# Patient Record
Sex: MALE | HISPANIC OR LATINO | Employment: OTHER | URBAN - METROPOLITAN AREA
[De-identification: names, ages, dates, MRNs, and addresses within clinical notes are randomized per-mention and may not be internally consistent; named-entity substitution may affect disease eponyms.]

---

## 2018-07-04 ENCOUNTER — HOSPITAL ENCOUNTER (EMERGENCY)
Facility: MEDICAL CENTER | Age: 68
End: 2018-07-04
Attending: EMERGENCY MEDICINE
Payer: MEDICARE

## 2018-07-04 ENCOUNTER — APPOINTMENT (OUTPATIENT)
Dept: RADIOLOGY | Facility: MEDICAL CENTER | Age: 68
End: 2018-07-04
Attending: EMERGENCY MEDICINE
Payer: MEDICARE

## 2018-07-04 VITALS
BODY MASS INDEX: 27.95 KG/M2 | TEMPERATURE: 98.3 F | HEIGHT: 65 IN | OXYGEN SATURATION: 98 % | RESPIRATION RATE: 16 BRPM | DIASTOLIC BLOOD PRESSURE: 95 MMHG | WEIGHT: 167.77 LBS | SYSTOLIC BLOOD PRESSURE: 151 MMHG | HEART RATE: 89 BPM

## 2018-07-04 DIAGNOSIS — I15.9 SECONDARY HYPERTENSION: ICD-10-CM

## 2018-07-04 DIAGNOSIS — R74.8 ABNORMAL SERUM LEVEL OF LIPASE: ICD-10-CM

## 2018-07-04 DIAGNOSIS — D64.9 ANEMIA, UNSPECIFIED TYPE: ICD-10-CM

## 2018-07-04 DIAGNOSIS — E87.5 HYPERKALEMIA: ICD-10-CM

## 2018-07-04 DIAGNOSIS — N17.9 ACUTE RENAL FAILURE, UNSPECIFIED ACUTE RENAL FAILURE TYPE (HCC): ICD-10-CM

## 2018-07-04 LAB
ALBUMIN SERPL BCP-MCNC: 4.3 G/DL (ref 3.2–4.9)
ALBUMIN/GLOB SERPL: 1.2 G/DL
ALP SERPL-CCNC: 95 U/L (ref 30–99)
ALT SERPL-CCNC: 9 U/L (ref 2–50)
ANION GAP SERPL CALC-SCNC: 11 MMOL/L (ref 0–11.9)
APPEARANCE UR: CLEAR
AST SERPL-CCNC: 13 U/L (ref 12–45)
BASOPHILS # BLD AUTO: 0.5 % (ref 0–1.8)
BASOPHILS # BLD: 0.03 K/UL (ref 0–0.12)
BILIRUB SERPL-MCNC: 0.3 MG/DL (ref 0.1–1.5)
BILIRUB UR QL STRIP.AUTO: NEGATIVE
BNP SERPL-MCNC: 59 PG/ML (ref 0–100)
BUN SERPL-MCNC: 48 MG/DL (ref 8–22)
CALCIUM SERPL-MCNC: 10.1 MG/DL (ref 8.5–10.5)
CHLORIDE SERPL-SCNC: 110 MMOL/L (ref 96–112)
CK SERPL-CCNC: 37 U/L (ref 0–154)
CO2 SERPL-SCNC: 17 MMOL/L (ref 20–33)
COLOR UR: YELLOW
CREAT SERPL-MCNC: 2.46 MG/DL (ref 0.5–1.4)
EKG IMPRESSION: NORMAL
EOSINOPHIL # BLD AUTO: 0.22 K/UL (ref 0–0.51)
EOSINOPHIL NFR BLD: 3.6 % (ref 0–6.9)
ERYTHROCYTE [DISTWIDTH] IN BLOOD BY AUTOMATED COUNT: 42.7 FL (ref 35.9–50)
GLOBULIN SER CALC-MCNC: 3.5 G/DL (ref 1.9–3.5)
GLUCOSE SERPL-MCNC: 105 MG/DL (ref 65–99)
GLUCOSE UR STRIP.AUTO-MCNC: NEGATIVE MG/DL
HCT VFR BLD AUTO: 43.5 % (ref 42–52)
HGB BLD-MCNC: 14 G/DL (ref 14–18)
IMM GRANULOCYTES # BLD AUTO: 0.02 K/UL (ref 0–0.11)
IMM GRANULOCYTES NFR BLD AUTO: 0.3 % (ref 0–0.9)
KETONES UR STRIP.AUTO-MCNC: NEGATIVE MG/DL
LACTATE BLD-SCNC: 1.8 MMOL/L (ref 0.5–2)
LEUKOCYTE ESTERASE UR QL STRIP.AUTO: NEGATIVE
LIPASE SERPL-CCNC: 91 U/L (ref 11–82)
LYMPHOCYTES # BLD AUTO: 1.63 K/UL (ref 1–4.8)
LYMPHOCYTES NFR BLD: 26.8 % (ref 22–41)
MCH RBC QN AUTO: 29.7 PG (ref 27–33)
MCHC RBC AUTO-ENTMCNC: 32.2 G/DL (ref 33.7–35.3)
MCV RBC AUTO: 92.2 FL (ref 81.4–97.8)
MICRO URNS: NORMAL
MONOCYTES # BLD AUTO: 0.4 K/UL (ref 0–0.85)
MONOCYTES NFR BLD AUTO: 6.6 % (ref 0–13.4)
NEUTROPHILS # BLD AUTO: 3.79 K/UL (ref 1.82–7.42)
NEUTROPHILS NFR BLD: 62.2 % (ref 44–72)
NITRITE UR QL STRIP.AUTO: NEGATIVE
NRBC # BLD AUTO: 0 K/UL
NRBC BLD-RTO: 0 /100 WBC
PH UR STRIP.AUTO: 5 [PH]
PLATELET # BLD AUTO: 369 K/UL (ref 164–446)
PMV BLD AUTO: 9.6 FL (ref 9–12.9)
POTASSIUM SERPL-SCNC: 5.5 MMOL/L (ref 3.6–5.5)
PROT SERPL-MCNC: 7.8 G/DL (ref 6–8.2)
PROT UR QL STRIP: NEGATIVE MG/DL
RBC # BLD AUTO: 4.72 M/UL (ref 4.7–6.1)
RBC UR QL AUTO: NEGATIVE
SODIUM SERPL-SCNC: 138 MMOL/L (ref 135–145)
SP GR UR STRIP.AUTO: 1.01
TROPONIN I SERPL-MCNC: <0.01 NG/ML (ref 0–0.04)
UROBILINOGEN UR STRIP.AUTO-MCNC: 0.2 MG/DL
WBC # BLD AUTO: 6.1 K/UL (ref 4.8–10.8)

## 2018-07-04 PROCEDURE — 83880 ASSAY OF NATRIURETIC PEPTIDE: CPT

## 2018-07-04 PROCEDURE — 71045 X-RAY EXAM CHEST 1 VIEW: CPT

## 2018-07-04 PROCEDURE — 36415 COLL VENOUS BLD VENIPUNCTURE: CPT

## 2018-07-04 PROCEDURE — 82550 ASSAY OF CK (CPK): CPT

## 2018-07-04 PROCEDURE — 83690 ASSAY OF LIPASE: CPT

## 2018-07-04 PROCEDURE — 80053 COMPREHEN METABOLIC PANEL: CPT

## 2018-07-04 PROCEDURE — 81003 URINALYSIS AUTO W/O SCOPE: CPT

## 2018-07-04 PROCEDURE — 99284 EMERGENCY DEPT VISIT MOD MDM: CPT

## 2018-07-04 PROCEDURE — 84484 ASSAY OF TROPONIN QUANT: CPT

## 2018-07-04 PROCEDURE — 93005 ELECTROCARDIOGRAM TRACING: CPT | Performed by: EMERGENCY MEDICINE

## 2018-07-04 PROCEDURE — 74176 CT ABD & PELVIS W/O CONTRAST: CPT

## 2018-07-04 PROCEDURE — 85025 COMPLETE CBC W/AUTO DIFF WBC: CPT

## 2018-07-04 PROCEDURE — 83605 ASSAY OF LACTIC ACID: CPT

## 2018-07-04 RX ORDER — PANTOPRAZOLE SODIUM 40 MG/1
40 TABLET, DELAYED RELEASE ORAL DAILY
COMMUNITY

## 2018-07-04 RX ORDER — SUCRALFATE 1 G/1
1 TABLET ORAL DAILY
COMMUNITY

## 2018-07-04 ASSESSMENT — PAIN SCALES - GENERAL
PAINLEVEL_OUTOF10: 0

## 2018-07-04 NOTE — ED PROVIDER NOTES
ED Provider Note    Scribed for Casey Balbuena M.D. by Adrian Mcdaniels. 7/4/2018  8:51 AM    Primary Care Provider: Clemente Rivera at [facility]  Means of arrival: Walk-in  History limited by: None    CHIEF COMPLAINT  Chief Complaint   Patient presents with   • Abnormal Labs     Sent in by PCP for elevated potassium after lab work yesterday.     • N/V       HPI  Dylon Ibrahim is a 68 y.o. male who presents to the ED complaining of vomiting this morning and was sent in by his PCP for abnormal labs. The patient reports of experiencing an episode of right upper abdomina pain a week ago that initially resolved, but developed similar symptoms again this morning with associated nausea and vomiting. The patient had blood work done yesterday at [facility] due to generalized malaise for the last week and was sent to the ED this morning for elevated potassium. He reports of sore throat, weight loss, and myalgia, but denies any headache, vision changes, ear pain, chest pain, shortness of breath, palpitations, diarrhea, dysuria, or rashes. He states the abdominal pain has now resolved. He reports of numbness to right fingers for the last week, but has history of arthritis in bilateral fingers. He is currently on Metformin for diabetes, Metoprolol for hypertension, Pantoprazole for GI, lovastatin for hyperlipidemia, and other medications.     Patient's daughter was able to help interpret. She states the patient has overall been normal and at baseline.     REVIEW OF SYSTEMS  CONSTITUTIONAL: Weight loss.   EYES:  Denies vision changes.   ENT: Sore throat. Denies ear pain.   CARDIOVASCULAR:  Denies chest pain, palpitations  RESPIRATORY:  Denies shortness of breath, difficulty breathing.  GI: Abdominal pain, nausea, vomiting. Denies diarrhea.   : Denies dysuria.   MUSCULOSKELETAL: Myalgia.   SKIN:  No rash  NEUROLOGIC:  Denies headache. Chronic numbness to right hand due to hx arthritis  C.     PAST MEDICAL HISTORY  Past Medical  "History:   Diagnosis Date   • DIABETES MELLITUS 9/13/2013    -oral meds    • GOUT 9/13/2013   • HTN (hypertension) 9/13/2013   • Hyperlipidemia 9/13/2013   • Nephrolithiasis 9/13/2013    -right, 2013    • NSTEMI (non-ST elevated myocardial infarction), Lcx 2010 9/13/2013    -PCI LCx ([facility])        FAMILY HISTORY  History reviewed. No pertinent family history.    SOCIAL HISTORY   reports that he has quit smoking. He has never used smokeless tobacco. He reports that he does not drink alcohol or use drugs.    SURGICAL HISTORY  History reviewed. No pertinent surgical history.    CURRENT MEDICATIONS  Home Medications     Reviewed by Sharonda Templeton R.N. (Registered Nurse) on 07/04/18 at 0848  Med List Status: Partial   Medication Last Dose Status   aspirin (ASA) 81 MG CHEW  Active   linagliptin (TRADJENTA) 5 MG Tab tablet  Active   lisinopril (PRINIVIL) 20 MG TABS  Active   lovastatin (MEVACOR) 20 MG TABS 7/3/2018 Active   metformin (GLUCOPHAGE) 1000 MG tablet 7/3/2018 Active   methimazole (TAPAZOLE) 5 MG TABS 2/5/2016 Active   metoprolol SR (TOPROL XL) 50 MG TB24 7/3/2018 Active   pantoprazole (PROTONIX) 40 MG Tablet Delayed Response 7/3/2018 Active   sucralfate (CARAFATE) 1 GM Tab 7/3/2018 Active                ALLERGIES  Allergies   Allergen Reactions   • Naprosyn [Naproxen]        PHYSICAL EXAM  VITAL SIGNS: /95   Pulse 93   Temp 36.8 °C (98.3 °F)   Resp 16   Ht 1.651 m (5' 5\")   Wt 76.1 kg (167 lb 12.3 oz)   SpO2 95%   BMI 27.92 kg/m²      Constitutional: Patient is awake and alert. No acute respiratory distress. Well developed, Well nourished, Non-toxic appearance.  HENT: Normocephalic, Atraumatic, Bilateral external ears normal, Oropharynx pink moist with no exudates, Nose patent.  Eyes: Sclera and conjunctiva clear, No discharge.   Neck:  Supple no nuchal rigidity, no thyromegaly or mass. Non-tender  Lymphatic: No supraclavicular lymph nodes.   Cardiovascular: Heart is regular rate and rhythm no " murmur, rub or thrill.   Thorax & Lungs: Chest is symmetrical, with good breath sounds. No wheezing or crackles. No respiratory distress, No chest tenderness.   Abdomen: Soft, No tenderness no hepatosplenomegaly there is no guarding or rebound, No masses, No pulsatile masses.  Skin: Warm, Dry, no petechia, purpura, or rash.   Back: Non tender with palpation, No CVA tenderness.   Extremities: No edema. Non tender.   Musculoskeletal: Good range of motion to wrists, elbows, shoulders, hips, knees, and ankles. Pulses 2+ radially and femorally. Unable to flex left fingers right > left, reports of pain when trying to form a fist, bilateral  weak.   Neurologic: Alert & oriented to person, time, and place. Sensory is intact to light touch to arms, and legs.  DTRs are symmetrical in biceps brachioradialis, patella and Achilles.   Psychiatric: Normal affect    EKG  0923- 13 Lead EKG interpreted by me shows normal sinus rhythm at rate 85.  . Axis QRS 21, No ST elevations. No peaked T waves. Questionable J point elevation in V5. No old EKG for comparison.   LABS  Results for orders placed or performed during the hospital encounter of 07/04/18   CBC WITH DIFFERENTIAL   Result Value Ref Range    WBC 6.1 4.8 - 10.8 K/uL    RBC 4.72 4.70 - 6.10 M/uL    Hemoglobin 14.0 14.0 - 18.0 g/dL    Hematocrit 43.5 42.0 - 52.0 %    MCV 92.2 81.4 - 97.8 fL    MCH 29.7 27.0 - 33.0 pg    MCHC 32.2 (L) 33.7 - 35.3 g/dL    RDW 42.7 35.9 - 50.0 fL    Platelet Count 369 164 - 446 K/uL    MPV 9.6 9.0 - 12.9 fL    Neutrophils-Polys 62.20 44.00 - 72.00 %    Lymphocytes 26.80 22.00 - 41.00 %    Monocytes 6.60 0.00 - 13.40 %    Eosinophils 3.60 0.00 - 6.90 %    Basophils 0.50 0.00 - 1.80 %    Immature Granulocytes 0.30 0.00 - 0.90 %    Nucleated RBC 0.00 /100 WBC    Neutrophils (Absolute) 3.79 1.82 - 7.42 K/uL    Lymphs (Absolute) 1.63 1.00 - 4.80 K/uL    Monos (Absolute) 0.40 0.00 - 0.85 K/uL    Eos (Absolute) 0.22 0.00 - 0.51 K/uL    Baso  (Absolute) 0.03 0.00 - 0.12 K/uL    Immature Granulocytes (abs) 0.02 0.00 - 0.11 K/uL    NRBC (Absolute) 0.00 K/uL   COMP METABOLIC PANEL   Result Value Ref Range    Sodium 138 135 - 145 mmol/L    Potassium 5.5 3.6 - 5.5 mmol/L    Chloride 110 96 - 112 mmol/L    Co2 17 (L) 20 - 33 mmol/L    Anion Gap 11.0 0.0 - 11.9    Glucose 105 (H) 65 - 99 mg/dL    Bun 48 (H) 8 - 22 mg/dL    Creatinine 2.46 (H) 0.50 - 1.40 mg/dL    Calcium 10.1 8.5 - 10.5 mg/dL    AST(SGOT) 13 12 - 45 U/L    ALT(SGPT) 9 2 - 50 U/L    Alkaline Phosphatase 95 30 - 99 U/L    Total Bilirubin 0.3 0.1 - 1.5 mg/dL    Albumin 4.3 3.2 - 4.9 g/dL    Total Protein 7.8 6.0 - 8.2 g/dL    Globulin 3.5 1.9 - 3.5 g/dL    A-G Ratio 1.2 g/dL   LIPASE   Result Value Ref Range    Lipase 91 (H) 11 - 82 U/L   TROPONIN   Result Value Ref Range    Troponin I <0.01 0.00 - 0.04 ng/mL   BTYPE NATRIURETIC PEPTIDE   Result Value Ref Range    B Natriuretic Peptide 59 0 - 100 pg/mL   LACTIC ACID   Result Value Ref Range    Lactic Acid 1.8 0.5 - 2.0 mmol/L   URINALYSIS CULTURE, IF INDICATED   Result Value Ref Range    Color Yellow     Character Clear     Specific Gravity 1.014 <1.035    Ph 5.0 5.0 - 8.0    Glucose Negative Negative mg/dL    Ketones Negative Negative mg/dL    Protein Negative Negative mg/dL    Bilirubin Negative Negative    Urobilinogen, Urine 0.2 Negative    Nitrite Negative Negative    Leukocyte Esterase Negative Negative    Occult Blood Negative Negative    Micro Urine Req see below    CREATINE KINASE   Result Value Ref Range    CPK Total 37 0 - 154 U/L   ESTIMATED GFR   Result Value Ref Range    GFR If  32 (A) >60 mL/min/1.73 m 2    GFR If Non African American 26 (A) >60 mL/min/1.73 m 2   All labs reviewed by me.    RADIOLOGY/PROCEDURES  CT-RENAL COLIC EVALUATION(A/P W/O)   Final Result      1 mm calculus at the left UVJ does not cause hydroureteronephrosis      Moderate coronary artery disease      DX-CHEST-PORTABLE (1 VIEW)   Final Result       1.  There is no acute cardiopulmonary process.                          The radiologist's interpretations of all radiological studies have been reviewed by me.     COURSE & MEDICAL DECISION MAKING  Pertinent Labs & Imaging studies reviewed. (See chart for details)    Differential diagnoses include but are not limited to abnormal labs, possible hyperkalemia vs lab error, possible medication side effects, infection, PNA, UTI, renal failure.     8:51 AM - Patient seen and examined at bedside. Ordered DX-chest, creatine kinase, CBC, CMP, lipase, troponin, BNP, lactic acid, urinalysis culture, and EKG.     10:05 AM I received a call from the nursing staff from [facility], who informed me the patient's potassium was noted to be 6.3 yesterday, but they do not have the official lab results. This notation was dictated by the provider some time through the night.     10:12 AM Reviewed the patient's lab results, which reveals mildly elevated lipase and labs consistent with kidney failure, which however is chronic. Ordered CT-abdomen pelvis without for further evaluation. Patient was reevaluated at bedside. He denies any abdominal pain at this time. Plan of care was updated with the patient and daughter at bedside.    12:33 PM Still waiting for CT results.     1315.  Recheck.  The patient is resting comfortably.  No abdominal pain whatsoever.    Medical decision making  Discussed the case with Dr. Hay nephrology.  We reviewed the labs and medications.  Will have him stop his Metformin and lisinopril.  There would like to see him in the office within 1 week.  I have also written an order for outpatient labs including a urine urine micral albumin ratio.  CBC and B N P.  I discussed this with the patient's daughter who is her .  I personally handed the 2 medications she should stop to her and told he should not take these any longer continue his other medications.  He appears to understand that he needs very  close follow-up as an outpatient that his lipase was elevated mildly.  That he has high blood pressure and borderline elevated potassium.  However his potassium now is 5.5.  I was told by the [facility] physician that they got a report that his potassium is 6.3 yesterday but it is not that high today.    Again I believe he is stable for discharge home for close follow-up as an outpatient.          FINAL IMPRESSION  1.  Renal failure  2.  Elevation lipase      PLAN  1.  Follow-up Dr. Hay nephrology within 1 week  2.  Repeat laboratories in the orders have been given to the patient to have these done prior to the visit to the nephrologist  3.  Follow-up with the [facility] clinic tomorrow  4.  Stop his metformin and lisinopril  5. Return to the emergency department for increased pains, fevers, vomiting or change in condition.     IAdrian (Scribe), am scribing for, and in the presence of, Casey Balbuena M.D..    Electronically signed by: Adrian Mcdaniels ([place]), 7/4/2018    ICasey M.D. personally performed the services described in this documentation, as scribed by Adrian Mcdaniels in my presence, and it is both accurate and complete.    The note accurately reflects work and decisions made by me.  Casey Balbuena  7/4/2018  2:43 PM

## 2018-07-04 NOTE — ED TRIAGE NOTES
".  Chief Complaint   Patient presents with   • Abnormal Labs     Sent in by PCP for elevated potassium after lab work yesterday.     • N/V     Vomiting in the mornings*10 days.     Patient ambulatory to triage.  Standard annual lab work showed abnormal potassium.  Possible abdominal pain currently, patient poor historian with language barrier.      Explained wait time and triage process to pt. Pt placed back out in lobby, told to notify ED tech or triage RN of any changes, verbalized understanding.    /95   Pulse 93   Temp 36.8 °C (98.3 °F)   Resp 16   Ht 1.651 m (5' 5\")   Wt 76.1 kg (167 lb 12.3 oz)   SpO2 95%   BMI 27.92 kg/m²     "

## 2018-07-04 NOTE — ED NOTES
Pt amb to restroom, urine to lab, Dr. Balbuena into talk with pt, pt updated on POC, no other needs at this time, family at BS

## 2018-07-04 NOTE — ED NOTES
Discharge instructions given.  All questions answered.  Reviewed with pt to stop taking Metformin and Lisinopril.  Pt to follow-up with PCP and Nephrologist.  Pt verbalized understanding.  All belongings with pt.  Pt ambulated to lobby.

## 2018-07-12 ENCOUNTER — OFFICE VISIT (OUTPATIENT)
Dept: NEPHROLOGY | Facility: MEDICAL CENTER | Age: 68
End: 2018-07-12
Payer: MEDICARE

## 2018-07-12 VITALS
SYSTOLIC BLOOD PRESSURE: 138 MMHG | WEIGHT: 177 LBS | RESPIRATION RATE: 16 BRPM | BODY MASS INDEX: 29.49 KG/M2 | OXYGEN SATURATION: 98 % | HEIGHT: 65 IN | DIASTOLIC BLOOD PRESSURE: 76 MMHG | TEMPERATURE: 98.4 F | HEART RATE: 98 BPM

## 2018-07-12 DIAGNOSIS — E55.9 VITAMIN D DEFICIENCY: ICD-10-CM

## 2018-07-12 DIAGNOSIS — E11.29 MICROALBUMINURIA DUE TO TYPE 2 DIABETES MELLITUS (HCC): ICD-10-CM

## 2018-07-12 DIAGNOSIS — I10 ESSENTIAL HYPERTENSION: ICD-10-CM

## 2018-07-12 DIAGNOSIS — N18.4 CKD (CHRONIC KIDNEY DISEASE), STAGE IV (HCC): ICD-10-CM

## 2018-07-12 DIAGNOSIS — R80.9 MICROALBUMINURIA DUE TO TYPE 2 DIABETES MELLITUS (HCC): ICD-10-CM

## 2018-07-12 DIAGNOSIS — M10.9 GOUT, UNSPECIFIED CAUSE, UNSPECIFIED CHRONICITY, UNSPECIFIED SITE: ICD-10-CM

## 2018-07-12 DIAGNOSIS — N20.0 NEPHROLITHIASIS: ICD-10-CM

## 2018-07-12 PROCEDURE — 99214 OFFICE O/P EST MOD 30 MIN: CPT | Performed by: INTERNAL MEDICINE

## 2018-07-12 ASSESSMENT — ENCOUNTER SYMPTOMS
COUGH: 0
ABDOMINAL PAIN: 0
NAUSEA: 0
WHEEZING: 0
EYES NEGATIVE: 1
SHORTNESS OF BREATH: 0
HEADACHES: 0
FOCAL WEAKNESS: 0
FLANK PAIN: 0
PALPITATIONS: 0
HEARTBURN: 0
FEVER: 0
VOMITING: 0
NECK PAIN: 0
CHILLS: 0
BACK PAIN: 0
WEIGHT LOSS: 0
ORTHOPNEA: 0
SENSORY CHANGE: 0
MYALGIAS: 0
DIZZINESS: 0

## 2018-07-12 NOTE — PROGRESS NOTES
"Subjective:      Dylon Ibrahim is a 65 y.o. male who [place] and Chronic Kidney Disease            Chronic Kidney Disease   Pertinent negatives include no abdominal pain, chest pain, chills, coughing, fever, headaches, myalgias, nausea, neck pain or vomiting.     Dylon is coming today for f/u of CKD IV  Last time seen in the clinic in 2016 YAZAN, severe right hydronephrosis  Passed kidney stone.  Creat level at that time 2.88  Recently seen in ER as has been called from lab with elevated K level  Repeated K in ER was 5.5 , creat level 2.54  Stopped Metformin and lisinopril  Doing well , no complaints  No dysuria, hematuria/flank pain  HTN: BP well controlled    Review of Systems   Constitutional: Negative for chills, fever, malaise/fatigue and weight loss.   HENT: Negative.    Eyes: Negative.    Respiratory: Negative for cough, shortness of breath and wheezing.    Cardiovascular: Negative for chest pain, palpitations, orthopnea and leg swelling.   Gastrointestinal: Negative for abdominal pain, heartburn, nausea and vomiting.   Genitourinary: Negative for dysuria, flank pain, frequency, hematuria and urgency.   Musculoskeletal: Negative for back pain, myalgias and neck pain.   Skin: Negative.    Neurological: Negative for dizziness, sensory change, focal weakness and headaches.   All other systems reviewed and are negative.         Objective:     /76   Pulse 98   Temp 36.9 °C (98.4 °F)   Resp 16   Ht 1.651 m (5' 5\")   Wt 80.3 kg (177 lb)   SpO2 98%   BMI 29.45 kg/m²      Physical Exam   Constitutional: He is oriented to person, place, and time. He appears well-developed and well-nourished.   HENT:   Head: Normocephalic and atraumatic.   Nose: Nose normal.   Mouth/Throat: Oropharynx is clear and moist.   Eyes: Conjunctivae and EOM are normal. Pupils are equal, round, and reactive to light.   Neck: Normal range of motion. Neck supple. No thyromegaly present. "   Cardiovascular: Normal rate, regular rhythm and normal heart sounds.  Exam reveals no gallop and no friction rub.    Pulmonary/Chest: Effort normal and breath sounds normal. No respiratory distress. He has no wheezes. He has no rales.   Abdominal: Soft. Bowel sounds are normal. He exhibits no distension. There is tenderness.   Musculoskeletal: He exhibits no edema.   Neurological: He is alert and oriented to person, place, and time. No cranial nerve deficit. Coordination normal.   Skin: Skin is warm and dry. No rash noted. No erythema.          Laboratory results reviewed: d/w Pt  Lab Results   Component Value Date/Time    CREATININE 2.46 (H) 07/04/2018 08:54 AM    CREATININE 1.4 06/05/2006 01:05 PM    POTASSIUM 5.5 07/04/2018 08:54 AM          Assessment/Plan:     1.CKD IV -to monitor closely                   CKD education  2.HTN: BP well controlled  3.Electrolytes: borderline elevated k -stopped lisinopril  4.Anemia: Hb WNL  5.DM II -to monitor microalbuminuria  6.Vit D def  7.Hx/of gout -monitor uric acid  Recs: monitor BP, keep well hydrated , low na diet             Avoid NSAID's             f/u in 2 months with BMP, urine microalb/creat ratio, vit D, PTH, uric acid

## 2018-08-07 ENCOUNTER — HOSPITAL ENCOUNTER (OUTPATIENT)
Dept: RADIOLOGY | Facility: MEDICAL CENTER | Age: 68
End: 2018-08-07
Attending: INTERNAL MEDICINE
Payer: MEDICARE

## 2018-08-07 DIAGNOSIS — N18.4 CKD (CHRONIC KIDNEY DISEASE), STAGE IV (HCC): ICD-10-CM

## 2018-08-07 DIAGNOSIS — N20.0 NEPHROLITHIASIS: ICD-10-CM

## 2018-08-07 PROCEDURE — 76775 US EXAM ABDO BACK WALL LIM: CPT

## 2021-03-03 DIAGNOSIS — Z23 NEED FOR VACCINATION: ICD-10-CM

## 2025-03-28 ENCOUNTER — HOSPITAL ENCOUNTER (OUTPATIENT)
Dept: LAB | Facility: MEDICAL CENTER | Age: 75
End: 2025-03-28
Attending: NURSE PRACTITIONER
Payer: MEDICARE

## 2025-03-28 LAB
ALBUMIN SERPL BCP-MCNC: 3 G/DL (ref 3.2–4.9)
BUN SERPL-MCNC: 19 MG/DL (ref 8–22)
CALCIUM ALBUM COR SERPL-MCNC: 10 MG/DL (ref 8.5–10.5)
CALCIUM SERPL-MCNC: 9.2 MG/DL (ref 8.5–10.5)
CHLORIDE SERPL-SCNC: 104 MMOL/L (ref 96–112)
CO2 SERPL-SCNC: 23 MMOL/L (ref 20–33)
CREAT SERPL-MCNC: 1.39 MG/DL (ref 0.5–1.4)
EST. AVERAGE GLUCOSE BLD GHB EST-MCNC: 177 MG/DL
GFR SERPLBLD CREATININE-BSD FMLA CKD-EPI: 54 ML/MIN/1.73 M 2
GLUCOSE SERPL-MCNC: 83 MG/DL (ref 65–99)
HBA1C MFR BLD: 7.8 % (ref 4–5.6)
PHOSPHATE SERPL-MCNC: 3.4 MG/DL (ref 2.5–4.5)
POTASSIUM SERPL-SCNC: 4.7 MMOL/L (ref 3.6–5.5)
SODIUM SERPL-SCNC: 139 MMOL/L (ref 135–145)
T4 FREE SERPL-MCNC: 0.49 NG/DL (ref 0.93–1.7)
TSH SERPL DL<=0.005 MIU/L-ACNC: 48.6 UIU/ML (ref 0.38–5.33)

## 2025-03-28 PROCEDURE — 84439 ASSAY OF FREE THYROXINE: CPT

## 2025-03-28 PROCEDURE — 84443 ASSAY THYROID STIM HORMONE: CPT

## 2025-03-28 PROCEDURE — 83036 HEMOGLOBIN GLYCOSYLATED A1C: CPT | Mod: GA

## 2025-03-28 PROCEDURE — 80069 RENAL FUNCTION PANEL: CPT

## 2025-03-28 PROCEDURE — 36415 COLL VENOUS BLD VENIPUNCTURE: CPT | Mod: GA

## 2025-07-22 NOTE — DISCHARGE INSTRUCTIONS
Hipertensión arterial  (Arterial Hypertension)  La hipertensión arterial (presión sanguínea elevada) es un trastorno que consiste en la elevación de la presión dentro de pamela vasos sanguíneos. La hipertensión, con el correr del tiempo, favorece el riesgo de padecer ictus, infartos o insuficiencias cardíacas. También es zeinab de las causas principales de insuficiencia renal (del riñón).   CAUSAS  · En adultos  Más del 90% de todos los casos de hipertensión no tienen causas conocidas. [place] se denomina hipertensión esencial o primaria. En el 10% restante de personas con hipertensión, el aumento en la presión arterial es causado por otras enfermedades. [place] se denomina hipertensión secundaria. LAS CAUSAS MÁS IMPORTANTES DE HIPERTENSIÓN SECUDARIA SON:  · Abuso en el consumo de bebidas alcohólicas.  · Apnea del sueño obstructiva.  · Hiperaldosteronismo (Síndrome de [place]).  · Uso de esteroides.  · Insuficiencia renal crónica.  · Hiperparatiroidismo.  · Medicamentos.  · Estenosis de la arteria renal.  · Feocromocitoma.  · Enfermedad de [place].  · Coartación de la aorta.  · Crisis de esclerodermia renal.  · Regaliz (en cantidades excesivas).  · Drogas (cocaína, metanfetamina).  El profesional que lo asiste le explicará los puntos mencionados arriba que pueden estar referidos a usted.  · En niños  La hipertensión secundaria es la más común y siempre debe ser considerada mckenzie causa.  · Embarazo  Pocas mujeres en edad de procrear tienen yuni presión arterial. Sin embargo, hasta el 10% de ellas desarrollan hipertensión inducida por el embarazo. En general, esto no perjudicará a la becca (gerardo). Puede ser un síntoma de 3 complicaciones del embarazo: preeclampsia, síndrome HELLP (por pamela siglas en inglés) y eclampsia. Es necesario realizar un seguimiento y el control con medicación.  SÍNTOMAS  · Esta enfermedad normalmente no produce ningún síntoma perceptible. Generalmente se descubre en un examen de rutina.  · La hipertensión  Lab and EKG performed for pending surgery in [place]   "maligna (hipertensión acelerada) es un problema (complicación) tardía de la presión arterial elevada. Puede tener los siguientes síntomas:  · Dolor de mckenzie.  · Visión borrosa.  · Daño en órganos sabas (significa que los riñones, el corazón, los pulmones y otros órganos se están dañando).  · Las situaciones de estrés pueden aumentar la presión arterial. Si zeinab persona con presión arterial normal tiene un aumento en anaya presión en el momento en que es controlada por el profesional, se denomina \"hipertensión de guardapolvo vyas\". Anaya gravedad no se conoce. Puede estar relacionado con un desarrollo futuro de hipertensión, o con complicaciones de la hipertensión.  · La hipertensión generalmente se confunde con tensión intelectual, estrés y ansiedad.  DIAGNÓSTICO  El diagnóstico se realiza mediante 3 mediciones de presión sanguínea individuales. Se miladis con zeinab diferencia de al menos zeinab semana entre cada zeinab. Si hay un órgano dañado por causa de la hipertensión, el diagnóstico puede realizarse sin repetir las mediciones.  La hipertensión normalmente se identifica (diagnostica) si se obtienen los siguientes niveles:  · Más de 140/90 mm Hg medidos en ambos brazos, en 3 mediciones distintas, a lo finesse de dos semanas.  · Más de 130/80 mm Hg debe considerarse un factor de riesgo y puede requerir tratamiento en pacientes con diabetes.  La presión arterial de más de 120/80 mm Hg se denomina \"pre-hipertensión\", aún en pacientes no diabéticos.  Para obtener zeinab medición precisa de la presión sanguínea, siga las siguientes indicaciones. Sea consciente de los factores que pueden alterar las mediciones de presión sanguínea.  · [place] la presión al menos zeinab hora luego de consumir cafeína.  · [place] la presión 30 minutos luego de fumar y sin estrés. Mandi es otro motivo para dejar de fumar: aumenta la presión arterial.  · Use un manguito de tamaño adecuado. Consulte al profesional que lo asiste si no está seguro acerca del tamaño que " "le corresponde.  · La mayor parte de los tensiómetros hogareños son automáticos. Medirán la presión sistólica y diastólica. La presión sistólica es la que se mide al comienzo de los sonidos. La presión diastólica es la presión en la que los sonidos desaparecen. Si usted es anciano, mida distintas presiones en distintas posiciones. Intente sentado, acostado o parado.  · Siéntese y descanse por al menos 5 minutos antes de ignacio las mediciones.  · No debería ignacio medicamentos mckenzie descongestionantes (estimulantes adrenérgicos) antes de ignacio la medición. Estos se encuentran en muchos medicamentos para el resfrío.  · [place] nota de las mediciones de anaya presión sanguínea e infórmeselas al profesional que lo asiste.  Si usted tiene hipertensión:  · El profesional que lo asiste podrá realizar exámenes para asegurarse de que no tenga hipertensión secundaria (ketan \"causas\", arriba).  · El profesional que lo asiste también podrá buscar síntomas de Síndrome Metabólico. También se denomina Síndrome X o Síndrome de Insulinorresistencia. Podrá tener juancho síndrome si, además de hipertensión, tiene diabetes del tipo 2, obesidad abdominal, y lípidos sanguíneos anormales.  · El profesional que lo asiste tomará anaya historia médica y familiar y realizará un examen físico.  · Las pruebas de diagnóstico pueden incluir exámenes de sanjay (de glucosa, colesterol, potasio, y función del riñón), un análisis de orina, o un electrocardiograma. Puede ser necesario realizar otras pruebas según anaya mable particular.  PREVENCIÓN  Existen algunos importantes consejos relacionados con anaya estilo de jaelyn que puede adoptar para reducir pamela probabilidades de desarrollar hipertensión.  · Mantenga un peso normal.  · Limite la cantidad de sal (sodio) en anaya dieta.  · Ejercítese regularmente.  · Limite el consumo de alcohol.  · Lleve zeinab dieta con suficiente potasio. Saline consejos específicos con el profesional que lo asiste.  · [place] un dieta DASH ( dieta que " ayuda a frenar la hipertensión por pamela siglas en inglés). Esta dieta es willis en frutas, vegetales, y productos lácteos descremados, y emily ciertos tipos de grasas.  PRONÓSTICO:  La hipertensión esencial no puede curarse. Los cambios en el estilo de jaelyn y el tratamiento médico pueden disminuir la presión sanguínea y reducir las complicaciones. El pronóstico de la hipertensión secundaria depende de la causa subyacente. Muchas personas controlan anaya hipertensión con medicamentos o cambios en anaya estilo de jaelyn y de esta forma pueden vivir zeinab jaelyn normal y saludable.   RIESGOS Y COMPLICACIONES.  Mientras la presión arterial yuni en sí misma no es zeinab enfermedad, a menudo requiere tratamiento debido a los efectos a finesse y corto plazo que tiene sobre muchos órganos. La hipertensión aumenta el riesgo de padecer:  · ACV o ictus (accidentes cerebrovasculares)  · Insuficiencia cardíaca debido a la presión arterial elevada crónica (Cardiomiopatía hipertensiva).  · Ataques cardíacos (infarto del miocardio).  · Lesión en la retina (retinopatía hipertensiva).  · Insuficiencia renal (nefropatía hipertensiva).  El profesional que lo asiste le explicará los puntos de esta lista que pueden estar referidos a usted. El tratamiento de la hipertensión puede reducir significativamente el riesgo de complicaciones.  TRATAMIENTO  · Se recomienda la pérdida de peso para los pacientes excedidos y la práctica regular de ejercicio. El buen estado físico reduce la presión arterial.  · La hipertensión leve generalmente se trata con dieta y ejercicio. Zeinab dieta willis en frutas y vegetales, lácteos descremados y alimentos bajos en grasas y sal (sodio) pueden ayudar a disminuir la presión arterial. La disminución del consumo de sal disminuye la presión arterial en un tercio de las personas.  · Si es fumador, abandone el hábito.  Estos pasos son muy efectivos para reducir la presión arterial. Estas acciones a seguir son fáciles de sugerir daniel  "difíciles de llevar a cabo. La mayoría de los pacientes con hipertensión moderada o grave finalmente necesita medicamentos para retornar la presión a niveles normales. Hay varios tipos de medicamentos que se emplean en el tratamiento. Las pastillas para la presión arterial (antihipertensivos) la disminuyen a través de pamela diferentes acciones. La disminución de la presión arterial en 10 mm Hg puede disminuir el riesgo de complicaciones en un 25 %.  El objetivo del tratamiento es el control efectivo de la presión arterial. [place] reducirá el riesgo de complicaciones. El profesional que lo asiste le ayudará a determinar el mejor tratamiento para usted, de acuerdo con anaya estilo de jaelyn. Lo que puede ser un excelente tratamiento para zeinab persona, puede no serlo para otra.  INSTRUCCIONES PARA EL CUIDADO DOMICILIARIO  · NO fume.  · Adapte anaya estilo de jaelyn de acuerdo a las indicaciones de la sección \"Prevención.\"  · Si está tomando medicamentos, siga las indicaciones cuidadosamente. Los medicamentos para la presión arterial deben tomarse según fueron prescritos. Saltear dosis reduce pamela beneficios. También lo pone en riesgo de padecer complicaciones.  · Concurra a las consultas de seguimiento con el profesional que lo asiste, según le haya indicado.  · Si se le solicita que controle anaya presión arterial en anaya hogar, siga las anteriores indicaciones de la sección \"diagnóstico.\"  SOLICITE ATENCIÓN MÉDICA SI:  · Kia que está sufriendo efectos secundarios de la medicación.  · Siente conor de mckenzie recurrentes o se siente mareado.  · Siente hinchazón en pamela tobillos.  · Tiene problemas visuales.  SOLICITE ATENCIÓN MÉDICA DE INMEDIATO SI:  · Comienza súbitamente a sentir dolor o presión en el pecho, dificultad para respirar, u otros síntomas de ataque cardíaco.  · Sufre zeinab cefalea grave.  · Tiene síntomas de ataque cardíaco (debilidad repentina, dificultad para hablar, dificultad para caminar).  ESTÉ SEGURO QUE:   · Comprende " las instrucciones para el yuni médica.  · Controlará anaya enfermedad.  · Solicitará atención médica de inmediato según las indicaciones.  Document Released: 12/18/2006 Document Revised: 03/11/2013  ExitCare® Patient Information ©2014 [employer].    Enfermedad renal - Adulto   (Kidney Disease, Adult)  Los riñones son dos órganos que se encuentran a cada lado de la columna vertebral entre el centro de la espalda y la parte anterior del abdomen. Los riñones:   · Eliminan los desechos y el exceso de agua de la sanjay.    · Producen hormonas importantes. Regulan la presión arterial, ayudan a mantener los huesos gina y a crear glóbulos rojos.    · Regulan los líquidos y los productos químicos en la sanjay y los tejidos.  La enfermedad renal ocurre cuando los riñones están dañados. El daño renal puede ser rápido (klarissa) o desarrollarse lentamente beverley un finesse periodo de tiempo (crónico). Un daño pequeño no puede causar problemas, daniel un daño importante puede impedir que los riñones funcionen de la manera adecuada. La detección temprana y el tratamiento de la enfermedad renal pueden prevenir que el daño renal se convierta en crónico, o empeore. Algunas enfermedades renales son curables, daniel la mayoría no lo son. Muchas personas con enfermedad renal pueden controlar anaya enfermedad y vivir zeinab jaelyn normal.   TIPOS DE ENFERMEDAD RENAL   · Lesión renal aguda. La lesión renal aguda ocurre cuando hay un daño súbito en los riñones.  · Enfermedades renales crónicas. La enfermedad renal crónica se produce cuando los riñones sufren un daño beverley un finesse período.  · Enfermedad renal en etapa terminal. Enfermedad renal terminal ocurre cuando los riñones están lange dañados que mercy de funcionar. En la enfermedad renal en etapa terminal, los riñones no pueden funcionar.  CAUSAS   Toda afección, enfermedad o evento que dañe los riñones, puede causar la enfermedad renal.   Lesión renal aguda.   · Problemas con el flujo  sanguíneo a los riñones. Las causas pueden ser:    · Pérdida de sanjay.    · Enfermedades cardíacas.    · Quemaduras graves.  · Enfermedad hepática.  · Lesión directa en el riñón. Las causas pueden ser:  · Algunos medicamentos.    · Infección renal.    · Intoxicación o consumo de sustancias tóxicas.    · Zeinab herida quirúrgica.    · Golpe en la brianne renal.    · Problemas en el flujo de orina. Las causas pueden ser:    · Cáncer.    · Piedras en el riñón.    · Agrandamiento de la próstata.  Enfermedades renales crónicas. Las causas más comunes de enfermedad renal crónica son la diabetes y la presión arterial yuni (hipertensión). Otras causas de enfermedad renal crónica pueden ser:   · Enfermedades que causan inflamación en las unidades de filtración de los riñones.    · Enfermedades que afectan el sistema inmunológico.    · Enfermedades genéticas.    · Medicamentos que dañan los riñones, mckenzie los antiinflamatorios no esteroides (ANA).    · Envenenamiento o exposición a sustancias tóxicas.    · Zeinab infección urinaria o renal recurrente.    · Problemas en el flujo de orina. Las causas pueden ser:  · Cáncer.    · Piedras en el riñón.    · La próstata agrandada en los hombres.  Enfermedad renal en etapa terminal. Esta enfermedad renal ocurre cuando la enfermedad renal crónica empeora. También puede aparecer después de zeinab lesión renal aguda.   SÍNTOMAS   · Hinchazón (edema) de las piernas, tobillos o pies.    · Cansancio (letargo).    · Náuseas o vómitos.    · Confusión.    · Problemas en la micción, tales mckenzie:    · Sensación de dolor o ardor al orinar.    · Disminución de la producción de orina.  · Sanjay en la orina.    · Aumento de los deseos de orinar, especialmente por la noche.  · Hipertensión arterial.   · Contracciones o calambres musculares.    · Falta de aire.    · Picazón persistente.    · Pérdida del apetito.  · Gusto metálico en la boca.  · Debilidad.    · Convulsiones.    · Dolor o molestias en el  pecho.  · Problemas para dormir.    · Dolor de mckenzie.    · La piel se oscurece o se aclara de manera anormal.    · Entumecimiento en las dank o en los pies.    · Aparecen hematomas con facilidad.    · Hipo frecuente.    · Se detiene la menstruación.  En algunos casos no hay síntomas.     DIAGNÓSTICO   La enfermedad renal crónica se puede detectar y diagnosticar mediante estudios que incluyen análisis de sanjay, de orina, diagnósticos por imágenes, o zeinab biopsia de riñón.   TRATAMIENTO   Lesión renal aguda. El tratamiento de la lesión renal aguda varía según la causa y la gravedad del daño renal. En los casos leves, puede no ser necesario el tratamiento. Los riñones pueden curarse por sí mismos. Si la insuficiencia renal aguda es más grave, el médico tratará la causa del daño renal, le dará un tratamiento para los riñones y tratará de prevenir las complicaciones. Los casos graves pueden requerir un procedimiento para eliminar los desechos tóxicos del cuerpo (diálisis) o zeinab cirugía para reparar el daño renal. La cirugía puede incluir:   · Reparación de un riñón dañado.    · Eliminación de zeinab obstrucción.    La mayoría de las veces, será necesario que pase la noche en el hospital.   Enfermedades renales crónicas. La mayoría de las enfermedades renales crónicas no pueden curarse. El tratamiento consiste en el alivio de los síntomas y en prevenir o retrasar la progresión de la enfermedad. El tratamiento puede incluir:   · Zeinab dieta especial. Debe evitar beber alcohol y las comidas que:    · Tengan cata añadida.    · Contengan yuni cantidad de potasio.    · Ronny ricos en proteínas.    · Medicamentos. Pueden ser para:    · Disminuir la presión arterial.    · Mejorar la anemia.    · Bajar la hinchazón.    · Proteger los huesos.    Enfermedad renal en etapa terminal. La enfermedad renal en etapa terminal es potencialmente mortal y debe ser tratada inmediatamente. Existen dos tipos de tratamiento para la enfermedad renal  en etapa terminal:   · Diálisis.    · Recibir un nuevo riñón (transplante de riñón).  Ambos tratamientos tienen riesgos y consecuencias graves. Además de la diálisis o el trasplante de riñón, podrá necesitar ignacio medicamentos para controlar la hipertensión y el colesterol y disminuir los niveles de fósforo en la sanjay.   DURACIÓN DE LA ENFERMEDAD   · Lesión renal aguda. La duración de esta enfermedad varía mucho de zeinab persona a otra. El tiempo que dura depende de la causa del daño renal. La lesión renal aguda puede convertirse en enfermedad renal crónica o enfermedad renal en etapa terminal.  · Enfermedades renales crónicas. Esta enfermedad suele durar toda la jaelyn. La enfermedad renal crónica puede empeorar con el tiempo para convertirse en enfermedad renal en etapa terminal. El tiempo que yudi para la enfermedad renal en etapa terminal para desarrollarse varía de zeinab persona a otra.  · Enfermedad renal en etapa terminal. Esta enfermedad dura hasta que se realiza un transplante.  PREVENCIÓN   En algunos casos la enfermedad renal puede prevenirse. Si usted sufre diabetes, hipertensión o cualquier otra enfermedad que pueda causar enfermedad renal, hay que tratar de evitarla:   · Zeinab dieta adecuada.  · Medicamentos.  · Modificaciones en el estilo de jaelyn.  PARA OBTENER MÁS INFORMACIÓN   American Association of Kidney Patients: www.aakp.org   National Kidney Foundation: www.kidney.org   [employer] Kid[employer]: www.akfinc.org   [facility]: www.lifeoptions.org and www.kidneyschool.org   Document Released: 03/26/2009 Document Revised: 12/04/2013  ExitCare® Patient Information ©2014 [employer].